# Patient Record
Sex: FEMALE | NOT HISPANIC OR LATINO | Employment: UNEMPLOYED | URBAN - METROPOLITAN AREA
[De-identification: names, ages, dates, MRNs, and addresses within clinical notes are randomized per-mention and may not be internally consistent; named-entity substitution may affect disease eponyms.]

---

## 2024-08-18 ENCOUNTER — OFFICE VISIT (OUTPATIENT)
Dept: URGENT CARE | Facility: URGENT CARE | Age: 19
End: 2024-08-18
Payer: COMMERCIAL

## 2024-08-18 VITALS
SYSTOLIC BLOOD PRESSURE: 110 MMHG | DIASTOLIC BLOOD PRESSURE: 69 MMHG | OXYGEN SATURATION: 99 % | HEART RATE: 65 BPM | RESPIRATION RATE: 18 BRPM | WEIGHT: 153.6 LBS | TEMPERATURE: 98.2 F

## 2024-08-18 DIAGNOSIS — S91.342A: Primary | ICD-10-CM

## 2024-08-18 PROCEDURE — 10120 INC&RMVL FB SUBQ TISS SMPL: CPT

## 2024-08-18 NOTE — PATIENT INSTRUCTIONS
Foreign body removed from the left foot.  Bacitracin and Band-Aid for dressing.  Return to clinic with any redness, swelling, drainage, bleeding, pain and/or fever/chills.

## 2024-08-18 NOTE — PROGRESS NOTES
Assessment & Plan   (S91.342A) Penetrating foreign body of skin of foot, left, initial encounter  (primary encounter diagnosis)  Plan: REMOVE FOREIGN BODY SIMPLE, Dressing    Foreign object removed using forceps with teeth.  Area was cleaned appropriately and bacitracin and Band-Aid were applied for dressing.  Advised the patient that her tetanus status should be updated today, however the patient declined.  Discussed the need to continue to use bacitracin and Band-Aid to the area for dressing.  Informed the patient to return to clinic with any redness, swelling, drainage, bleeding, pain and or fever/chills.  Patient acknowledged her understanding of the above plan.    The use of Dragon/Naehas dictation services may have been used to construct the content in this note; any grammatical or spelling errors are non-intentional. Please contact the author of this note directly if you are in need of any clarification.      LATRELL Alexandra CNP  [facility]     Vikki is a 18 year old female who presents to clinic today for the following health issues:  Chief Complaint   Patient presents with    Foreign Body in Skin     Glass in bottom of left foot, happened today.      HPI  Patient reports stepping on what she presumes to be glass in a river earlier today.  She made an attempt to remove a foreign object but does not think she had it fully removed.  Her tetanus status is not up-to-date.    ROS:  Negative except noted above.    Review of Systems        Objective    /69 (BP Location: Left arm, Cuff Size: Adult Regular)   Pulse 65   Temp 98.2  F (36.8  C) (Tympanic)   Resp 18   Wt 69.7 kg (153 lb 9.6 oz)   LMP 08/17/2024 (Exact Date)   SpO2 99%   Physical Exam   GENERAL: alert and no distress  SKIN: visible foreign object superficially on the plantar aspect of the left foot lateral to the midfoot.  Puncture wound present without bleeding.